# Patient Record
(demographics unavailable — no encounter records)

---

## 2025-02-10 NOTE — HISTORY OF PRESENT ILLNESS
[FreeTextEntry1] : Pt is a 32 y/o F PMH HLD, BMI 35 Pt reports feeling well and has no active cardiac complaints - denies CP, SOB, palpitations, dizziness, syncope, edema, orthopnea, PND, orthopnea.  No exertional symptoms. She notes that she has not taking statin because she forgets to take it - last dose 11/2024  TTE 09/2024 EF 66%, trace TR ETT 09/2024 no ischemic changes   PCP [facility] additional PMH: anxiety/depression Previous hospitalizations: none Previous surgeries: none Family hx: no SCD, mother HTN/DM/DVT, grandfather CHF, grandmother "heart aneurysm", grandmother "OHS" Smoking status: never social ETOH no drug use Current exercise: walking daily Daily water intake: 40oz Daily caffeine intake: none OTC medications: none 0 children - no problem with pregnancies

## 2025-02-10 NOTE — DISCUSSION/SUMMARY
[EKG obtained to assist in diagnosis and management of assessed problem(s)] : EKG obtained to assist in diagnosis and management of assessed problem(s) [FreeTextEntry1] : Pt is a 32 y/o F PMH HLD, BMI 35  HLD: repeat labs - if chol high will restart lipitor Advised lifestyle modifications   Pt will return in 6-12 mnths or sooner as needed but I encouraged communication via phone or portal if necessary.  We will call pt with test results when applicable and arrange for expedited follow up if necessary.  Most recent available lab results were reviewed with pt. The described plan was discussed with the pt.  All questions and concerns were addressed to the best of my knowledge.

## 2025-03-10 NOTE — HISTORY OF PRESENT ILLNESS
[Patient reported PAP Smear was normal] : Patient reported PAP Smear was normal [N] : Patient denies prior pregnancies [Currently Active] : currently active [Men] : men [Yes] : Yes [Condoms] : Condoms [FreeTextEntry1] : Patient is a 31-year-old female new to my practice, here for evaluation because of increased chin hair and hair growth around her breasts.  Started noticing this hair growth in her 20s and as time went on, feels that hair has grown more.  Friends and family encouraged her to get checked for possible polycystic ovary syndrome or other hormonal issues. Patient was in a relationship with his significant other and had a Nexplanon placed in December 2024, but just had it removed 2 days ago because she is not currently sexually active with this partner anymore.  Prior to Nexplanon placement, menses were coming monthly and regularly.  Patient is requesting full STD panel including blood work for hepatitis and HIV and syphilis Patient denies any changes in weight and no significant changes in temperature regulation.   Patient denies any breast lumps, pain, or discharge. Patient started the Gardasil series and will be getting her last dose this April at [facility] [PapSmeardate] : 12/23 [TextBox_31] : Negative [TextBox_102] : HAD NEXPLANON REMOVED 03/08/2025 [LMPDate] : 12/12/24 [TextBox_36] : Completing Gardasil series April '25

## 2025-03-10 NOTE — PLAN
[FreeTextEntry1] : Pap smear drawn and sent. STD panel ordered as per patient request, including HIV and hepatitis and syphilis. Long conversation with patient regarding hirsutism and causes of this including elevated testosterone levels, and PCOS.  Reviewed the etiology of these disorders and explained workup includes sending for hormonal panel as well as ordering a pelvic ultrasound.  I told her if all of the levels come back normal, then she essentially just has excessive hair growth without a specific etiology.  I told her that elevated testosterone levels can be managed with hormonal contraceptives such as OCPs, that increase testosterone binding globulin to reduce testosterone effect, or spironolactone.  I will call her after I get the hormone panel and sonogram results.  Patient states that she just wants to be reassured that all her levels are "normal".  I also included a repeat lipid panel and hemoglobin A1c, and explained that usually check these when doing a workup for possible PCOS.

## 2025-03-10 NOTE — PHYSICAL EXAM
[Appropriately responsive] : appropriately responsive [Alert] : alert [No Acute Distress] : no acute distress [No Lymphadenopathy] : no lymphadenopathy [Regular Rate Rhythm] : regular rate rhythm [No Murmurs] : no murmurs [Clear to Auscultation B/L] : clear to auscultation bilaterally [Soft] : soft [Non-tender] : non-tender [Non-distended] : non-distended [No HSM] : No HSM [No Lesions] : no lesions [No Mass] : no mass [Oriented x3] : oriented x3 [FreeTextEntry7] : Hairs seen in the lower abdomen, under the umbilicus leading down to the mons pubis [FreeTextEntry1] : Normal, no lesions [FreeTextEntry2] : Normal, no lesions [FreeTextEntry4] : Normal, no lesions seen or palpated.  Small amount of white discharge in vault [FreeTextEntry5] : Smooth, pink, no lesions.  No cervical motion tenderness [FreeTextEntry6] : Anteverted, small, mobile, nontender.  No adnexal masses or tenderness bilaterally

## 2025-03-19 NOTE — PLAN
[FreeTextEntry1] : Reviewed PCOS and future implications for elevated cholesterol and diabetes and infertility.  She will keep me updated if her menstrual cycles do not return normally.

## 2025-03-19 NOTE — HISTORY OF PRESENT ILLNESS
[FreeTextEntry1] : Previous note: Patient is a 31-year-old female new to my practice, here for evaluation because of increased chin hair and hair growth around her breasts.  Started noticing this hair growth in her 20s and as time went on, feels that hair has grown more.  Friends and family encouraged her to get checked for possible polycystic ovary syndrome or other hormonal issues. Patient was in a relationship with his significant other and had a Nexplanon placed in December 2024, but just had it removed 2 days ago because she is not currently sexually active with this partner anymore.  Prior to Nexplanon placement, menses were coming monthly and regularly.  Patient is requesting full STD panel including blood work for hepatitis and HIV and syphilis Patient denies any changes in weight and no significant changes in temperature regulation.   Patient denies any breast lumps, pain, or discharge. Patient started the Gardasil series and will be getting her last dose this April at [facility]  Today's Note: Patient went for hormonal panel and all actually came back within normal limits.  Testosterone levels normal,  Thyroid and prolactin, etc. all normal.  Cholesterol and hemoglobin A1c with just slightly elevated. Patient underwent ultrasound today and that shows a normal uterus, but bilateral ovaries do have polycystic appearance with multiple little follicles throughout. Explained to the patient that based on all of these findings, I do feel she likely has polycystic ovary syndrome but that with her recent history of menses always coming monthly and normally, and not having excessively elevated lipid or hemoglobin A1c levels, she is overall still healthy and not needing other interventions. Reviewed that if menses do not return monthly since coming off the Nexplanon by June, should call so this way we can discuss menstrual regulation with hormones, such as OCPs or progesterone withdrawal. I also explained that because the testosterone level is normal, does not qualify for spironolactone. Patient expresses understanding of all of the above. [Patient reported PAP Smear was normal] : Patient reported PAP Smear was normal [N] : Patient denies prior pregnancies [PapSmeardate] : 3/25 [TextBox_31] : Negative [TextBox_102] : HAD NEXPLANON REMOVED 03/08/2025 [LMPDate] : 12/12/24 [TextBox_36] : Completing Gardasil series April '25 [Previously active] : previously active [Men] : men

## 2025-04-22 NOTE — HEALTH RISK ASSESSMENT
[Very Good] : ~his/her~  mood as very good [Yes] : Yes [2 - 4 times a month (2 pts)] : 2-4 times a month (2 points) [1 or 2 (0 pts)] : 1 or 2 (0 points) [Never (0 pts)] : Never (0 points) [No] : In the past 12 months have you used drugs other than those required for medical reasons? No [0] : 2) Feeling down, depressed, or hopeless: Not at all (0) [PHQ-2 Negative - No further assessment needed] : PHQ-2 Negative - No further assessment needed [Patient reported PAP Smear was normal] : Patient reported PAP Smear was normal [With Family] : lives with family [Employed] : employed [Smoke Detector] : smoke detector [Carbon Monoxide Detector] : carbon monoxide detector [Seat Belt] :  uses seat belt [Sunscreen] : uses sunscreen [Never] : Never [Audit-CScore] : 2 [de-identified] : walking  [de-identified] : trying to follow a low fat diet  [[id number]] : 0 [Reports changes in hearing] : Reports no changes in hearing [Reports changes in vision] : Reports no changes in vision [Reports changes in dental health] : Reports no changes in dental health [PapSmearDate] : 03/25 [FreeTextEntry2] :

## 2025-04-22 NOTE — REVIEW OF SYSTEMS
[Wheezing] : wheezing [Cough] : cough [Frequency] : frequency [Fever] : no fever [Chills] : no chills [Vision Problems] : no vision problems [Earache] : no earache [Nasal Discharge] : no nasal discharge [Sore Throat] : no sore throat [Chest Pain] : no chest pain [Palpitations] : no palpitations [Shortness Of Breath] : no shortness of breath [Abdominal Pain] : no abdominal pain [Nausea] : no nausea [Constipation] : no constipation [Diarrhea] : diarrhea [Vomiting] : no vomiting [Dysuria] : no dysuria [Itching] : no itching [Mole Changes] : no mole changes [Skin Rash] : no skin rash [Headache] : no headache [Dizziness] : no dizziness

## 2025-04-22 NOTE — HISTORY OF PRESENT ILLNESS
[FreeTextEntry1] : Establish care only [de-identified] : 30yo female with PMH of HLD, PCOS presenting to the office to establish care.  Patient previously followed with PMD Dr. Jay Marlow who has left practice. Then saw a primary at [facility] and had CPE in 8/2024.   Saw GYN for concerns of chin hair growth, polycystic ovaries on ultrasound. She reports she had many characteristics of PCOS.  Does endorse regular periods.   Needs last dose of Gardasil.  Follows with cardiologist Dr. Selby for history of HLD. Diet controlled.   She is concerned because she was having a cough following eat [employer] four days ago. Reports wheezing and she has never experienced this before. Coughing lasted for three hours and resolved with cough syrup. Denies feeling short of breath.

## 2025-04-22 NOTE — ASSESSMENT
[Vaccines Reviewed] : Immunizations reviewed today. Please see immunization details in the vaccine log within the immunization flowsheet.  [FreeTextEntry1] : #HCM - Patient presenting to [facility] care only, had CPE in 8/2024 with a different provider - Received flu vaccine this season - Up to date with pap smear - Reviewed labs from GYN with patient - F/u in 8/2025 for CPE   #HPV vaccination - Due for third dose of HPV today - Administered Gardasil to left upper arm, patient tolerated well  #Prediabetes/HLD - Discussed labs from GYN - Discussed dietary and exercise modifications to help lower A1c and cholesterol - Cholesterol is improved compared to previous panels - New prediabetes, strong family history of T2DM and PCOS puts her at risk for insulin resistance  #Cough - Episode of coughing following eating [employer] four days ago - Endorses associated wheeze, no shortness of breath - Likely from GERD - Improved with cough medication - Monitor for now - If persistent will consider rescue inhaler

## 2025-06-02 NOTE — HISTORY OF PRESENT ILLNESS
[FreeTextEntry8] : 30yo female presenting to the office complaining of left knee pain.  Reports one month ago, she started playing volleyball to feel more active. She reports that she was running down the court, bending. She does not recall any specific trauma or popping motion, but reports the next day when she awoke she could barely put weight on her leg. Finds that the pain has been the same throughout this month. Endorses a feeling like her knee is buckling, especially when she is walking down the stairs.  She reports worsened pain following activity.  She feels a sensation that the knee will give out.  In certain large planes of motion she feels like the knee with pop.  She has not needed to take any medications for this, reports more discomfort than pain.

## 2025-06-02 NOTE — PHYSICAL EXAM
[No Acute Distress] : no acute distress [Well-Appearing] : well-appearing [No Respiratory Distress] : no respiratory distress  [No Joint Swelling] : no joint swelling [Normal Affect] : the affect was normal [Normal Insight/Judgement] : insight and judgment were intact [de-identified] : positive posterior draw, with laxity in flexion and extension of the knee

## 2025-06-02 NOTE — ASSESSMENT
[FreeTextEntry1] : #Left knee pain - Presenting to the office complaining of left knee pain x 1 month following playing volleyball - Denies trauma or popping sensation - Reports worsened pain/discomfort with walking downstairs, bending knee in flexion and extension - On examination there is laxity with flexion and extension, with reported pain with full extension of the knee and a positive posterior drawer test - High concern for PCL strain/tear  - Will start with X-ray imaging and plan ultimately for MRI to evaluate for soft tissue tearing - Advise RICE